# Patient Record
Sex: MALE | Race: WHITE | Employment: FULL TIME | URBAN - METROPOLITAN AREA
[De-identification: names, ages, dates, MRNs, and addresses within clinical notes are randomized per-mention and may not be internally consistent; named-entity substitution may affect disease eponyms.]

---

## 2018-12-10 ENCOUNTER — HOSPITAL ENCOUNTER (OUTPATIENT)
Dept: MAMMOGRAPHY | Age: 58
Discharge: HOME | End: 2018-12-12
Payer: COMMERCIAL

## 2018-12-10 ENCOUNTER — HOSPITAL ENCOUNTER (OUTPATIENT)
Dept: ULTRASOUND IMAGING | Age: 58
Discharge: HOME | End: 2018-12-10
Payer: COMMERCIAL

## 2018-12-10 DIAGNOSIS — N64.4 NIPPLE PAIN: ICD-10-CM

## 2018-12-10 DIAGNOSIS — N64.4 BREAST PAIN: ICD-10-CM

## 2018-12-10 PROCEDURE — 76642 ULTRASOUND BREAST LIMITED: CPT

## 2018-12-10 PROCEDURE — 77066 DX MAMMO INCL CAD BI: CPT

## 2019-12-06 RX ORDER — GLIPIZIDE 5 MG/1
5 TABLET ORAL NIGHTLY
COMMUNITY

## 2019-12-09 ENCOUNTER — ANESTHESIA EVENT (OUTPATIENT)
Dept: ENDOSCOPY | Age: 59
End: 2019-12-09
Payer: COMMERCIAL

## 2019-12-09 ENCOUNTER — HOSPITAL ENCOUNTER (OUTPATIENT)
Age: 59
Setting detail: OUTPATIENT SURGERY
Discharge: HOME | End: 2019-12-09
Attending: INTERNAL MEDICINE | Admitting: INTERNAL MEDICINE
Payer: COMMERCIAL

## 2019-12-09 ENCOUNTER — ANESTHESIA (OUTPATIENT)
Dept: ENDOSCOPY | Age: 59
End: 2019-12-09
Payer: COMMERCIAL

## 2019-12-09 VITALS
OXYGEN SATURATION: 98 % | RESPIRATION RATE: 14 BRPM | SYSTOLIC BLOOD PRESSURE: 83 MMHG | DIASTOLIC BLOOD PRESSURE: 38 MMHG

## 2019-12-09 VITALS
WEIGHT: 169 LBS | TEMPERATURE: 96.5 F | BODY MASS INDEX: 23.66 KG/M2 | HEIGHT: 71 IN | SYSTOLIC BLOOD PRESSURE: 118 MMHG | RESPIRATION RATE: 20 BRPM | OXYGEN SATURATION: 96 % | HEART RATE: 69 BPM | DIASTOLIC BLOOD PRESSURE: 78 MMHG

## 2019-12-09 LAB — METER GLUCOSE: 212 MG/DL (ref 74–99)

## 2019-12-09 PROCEDURE — 3700000001 HC ADD 15 MINUTES (ANESTHESIA): Performed by: INTERNAL MEDICINE

## 2019-12-09 PROCEDURE — 82962 GLUCOSE BLOOD TEST: CPT

## 2019-12-09 PROCEDURE — 2580000003 HC RX 258: Performed by: ANESTHESIOLOGY

## 2019-12-09 PROCEDURE — 7100000011 HC PHASE II RECOVERY - ADDTL 15 MIN: Performed by: INTERNAL MEDICINE

## 2019-12-09 PROCEDURE — 3609027000 HC COLONOSCOPY: Performed by: INTERNAL MEDICINE

## 2019-12-09 PROCEDURE — 7100000010 HC PHASE II RECOVERY - FIRST 15 MIN: Performed by: INTERNAL MEDICINE

## 2019-12-09 PROCEDURE — 6360000002 HC RX W HCPCS: Performed by: NURSE ANESTHETIST, CERTIFIED REGISTERED

## 2019-12-09 PROCEDURE — 3700000000 HC ANESTHESIA ATTENDED CARE: Performed by: INTERNAL MEDICINE

## 2019-12-09 PROCEDURE — 2709999900 HC NON-CHARGEABLE SUPPLY: Performed by: INTERNAL MEDICINE

## 2019-12-09 RX ORDER — PROPOFOL 10 MG/ML
INJECTION, EMULSION INTRAVENOUS CONTINUOUS PRN
Status: DISCONTINUED | OUTPATIENT
Start: 2019-12-09 | End: 2019-12-09 | Stop reason: SDUPTHER

## 2019-12-09 RX ORDER — SODIUM CHLORIDE 9 MG/ML
INJECTION, SOLUTION INTRAVENOUS CONTINUOUS
Status: DISCONTINUED | OUTPATIENT
Start: 2019-12-09 | End: 2019-12-09 | Stop reason: HOSPADM

## 2019-12-09 RX ORDER — FENTANYL CITRATE 50 UG/ML
INJECTION, SOLUTION INTRAMUSCULAR; INTRAVENOUS PRN
Status: DISCONTINUED | OUTPATIENT
Start: 2019-12-09 | End: 2019-12-09 | Stop reason: SDUPTHER

## 2019-12-09 RX ORDER — SODIUM CHLORIDE 0.9 % (FLUSH) 0.9 %
10 SYRINGE (ML) INJECTION EVERY 12 HOURS SCHEDULED
Status: DISCONTINUED | OUTPATIENT
Start: 2019-12-09 | End: 2019-12-09 | Stop reason: HOSPADM

## 2019-12-09 RX ADMIN — SODIUM CHLORIDE: 9 INJECTION, SOLUTION INTRAVENOUS at 08:15

## 2019-12-09 RX ADMIN — FENTANYL CITRATE 50 MCG: 50 INJECTION, SOLUTION INTRAMUSCULAR; INTRAVENOUS at 09:00

## 2019-12-09 RX ADMIN — PROPOFOL 100 MCG/KG/MIN: 10 INJECTION, EMULSION INTRAVENOUS at 09:11

## 2019-12-09 RX ADMIN — SODIUM CHLORIDE: 9 INJECTION, SOLUTION INTRAVENOUS at 09:00

## 2019-12-09 RX ADMIN — FENTANYL CITRATE 50 MCG: 50 INJECTION, SOLUTION INTRAMUSCULAR; INTRAVENOUS at 09:15

## 2019-12-09 ASSESSMENT — PAIN SCALES - GENERAL
PAINLEVEL_OUTOF10: 0
PAINLEVEL_OUTOF10: 0

## 2019-12-09 ASSESSMENT — PAIN - FUNCTIONAL ASSESSMENT: PAIN_FUNCTIONAL_ASSESSMENT: 0-10

## 2021-07-29 ENCOUNTER — HOSPITAL ENCOUNTER (EMERGENCY)
Age: 61
Discharge: HOME | End: 2021-07-29
Attending: EMERGENCY MEDICINE
Payer: COMMERCIAL

## 2021-07-29 VITALS
DIASTOLIC BLOOD PRESSURE: 87 MMHG | OXYGEN SATURATION: 98 % | RESPIRATION RATE: 18 BRPM | HEART RATE: 88 BPM | TEMPERATURE: 97.3 F | SYSTOLIC BLOOD PRESSURE: 127 MMHG

## 2021-07-29 DIAGNOSIS — T15.91XA FOREIGN BODY OF RIGHT EYE, INITIAL ENCOUNTER: Primary | ICD-10-CM

## 2021-07-29 PROCEDURE — 99283 EMERGENCY DEPT VISIT LOW MDM: CPT

## 2021-07-29 RX ORDER — TOBRAMYCIN 3 MG/ML
1 SOLUTION/ DROPS OPHTHALMIC EVERY 6 HOURS
Qty: 1 BOTTLE | Refills: 0 | Status: SHIPPED | OUTPATIENT
Start: 2021-07-29 | End: 2021-08-01

## 2021-07-29 RX ORDER — SODIUM CHLORIDE 9 MG/ML
INJECTION, SOLUTION INTRAVENOUS
Status: DISCONTINUED
Start: 2021-07-29 | End: 2021-07-29 | Stop reason: HOSPADM

## 2021-07-29 ASSESSMENT — PAIN SCALES - GENERAL: PAINLEVEL_OUTOF10: 2

## 2021-07-29 NOTE — ED NOTES
Bed: 15  Expected date:   Expected time:   Means of arrival:   Comments:     Sabino Watters RN  07/29/21 [id number]

## 2021-07-29 NOTE — ED PROVIDER NOTES
History of Present Illness     Patient Identification  Ryne Madrigal is a 56 y.o. male. Patient information was obtained from patient. History/Exam limitations: none. Patient presented to the Emergency Department by private vehicle. Chief Complaint   Foreign Body (pt was working outside around wood and had object go into eye, pt states it feels like it is in his eyelid)      Patient presents for evaluation of right eye foreign body. Patient is working taking on a wall when developed a foreign body sensation in his right eye. Is been persistent for the past hour or so. The pain worsens when he blinks. Improves when he holds his eyes shut. He denies any change in vision. He denies any direct eye trauma. Rates his pain as moderate. Past Medical History:   Diagnosis Date    BPH (benign prostatic hyperplasia)     Diabetes mellitus ([address]     Gout     Hx of blood clots     leg    Hyperlipidemia     Hypertension     MDRO (multiple drug resistant organisms) resistance 2006    MRSA  Rt leg     Family History   Problem Relation Age of Onset    Cancer Mother         lung    Alcohol Abuse Father      Current Facility-Administered Medications   Medication Dose Route Frequency Provider Last Rate Last Admin    fluorescein 0.6 MG ophthalmic strip             sodium chloride 0.9 % infusion              Current Outpatient Medications   Medication Sig Dispense Refill    glipiZIDE (GLUCOTROL) 5 MG tablet Take 5 mg by mouth nightly      SEMAGLUTIDE,0.25 OR 0.5MG/DOS, SC Inject into the skin once a week Sundays      finasteride (PROSCAR) 5 MG tablet Take 5 mg by mouth daily       GLIPIZIDE XL 5 MG extended release tablet Take 10 mg by mouth daily (with breakfast)       Multiple Vitamins-Minerals (THERAPEUTIC MULTIVITAMIN-MINERALS) tablet Take 1 tablet by mouth daily      pantoprazole (PROTONIX) 40 MG tablet Take 1 tablet by mouth daily 30 tablet 3    aspirin 325 MG EC tablet Take 325 mg by mouth daily.       metFORMIN (GLUCOPHAGE) 1000 MG tablet Take 1,000 mg by mouth 2 times daily (with meals). atorvastatin (LIPITOR) 10 MG tablet Take 10 mg by mouth daily. allopurinol (ZYLOPRIM) 300 MG tablet Take 300 mg by mouth daily. lisinopril (PRINIVIL;ZESTRIL) 40 MG tablet Take 40 mg by mouth daily. colchicine 0.6 MG tablet Take 0.6 mg by mouth 2 times daily. Allergies   Allergen Reactions    Penicillins Nausea And Vomiting     Can tolerate IV PCN     Social History     Socioeconomic History    Marital status:      Spouse name: Not on file    Number of children: Not on file    Years of education: Not on file    Highest education level: Not on file   Occupational History    Not on file   Tobacco Use    Smoking status: Never Smoker    Smokeless tobacco: Never Used   Vaping Use    Vaping Use: Never used   Substance and Sexual Activity    Alcohol use: Yes     Alcohol/week: 1.0 standard drinks     Types: 1 Cans of beer per week     Comment: rare    Drug use: No    Sexual activity: Not on file   Other Topics Concern    Not on file   Social History Narrative    Not on file     Social Determinants of Health     Financial Resource Strain:     Difficulty of Paying Living Expenses:    Food Insecurity:     Worried About [facility] in the Last Year:     Ran Out of Food in the Last Year:    Transportation Needs:     Lack of Transportation (Medical):     Lack of Transportation (Non-Medical):    Physical Activity:     Days of Exercise per Week:     Minutes of Exercise per Session:    Stress:     Feeling of Stress :    Social Connections:     Frequency of Communication with Friends and Family:     Frequency of Social Gatherings with Friends and Family:     Attends Confucianist Services:      Active Member of Clubs or Organizations:     Attends Club or Organization Meetings:     Marital Status:    Intimate Partner Violence:     Fear of Current or Ex-Partner:     Emotionally Abused:     Physically Abused:     Sexually Abused:        Review of Systems  . REVIEW OF SYSTEMS:   CONSTITUTIONAL: Denies: fever, chills, diaphoresis, weakness  ALLERGIES: Denies: urticaria, angioedema  EYES: See HPI  ENT: Denies  ore throat, nasal congestion, epistaxis, hearing loss  RESPIRATORY: Denies: cough, hemoptysis, shortness of breath  ENDOCRINE:Denies polydipsia/polyuria, palpitations  HEME-LYMPH: Denies: swollen lymph nodes, bleeding  GI: Denies abdominal pain, flank pain, nausea, vomiting  : Denies: dysuria, hematuria, pelvic pain  MUSCULOSKELETAL: Denies: back pain, joint stiffness, muscle pain  SKIN: Denies: rash, itching, hives    Physical Exam     /87   Pulse 88   Temp 97.3 °F (36.3 °C)   Resp 18   SpO2 98%   Visual Acuity: OS: 20/50      OD:  20/50  General: Awake. Alert in no acute distress. HEENT: atraumatic, normocephalic. PERRLA EOMI, inspection is negative for foreign body. Eversion of upper lower lids is negative for foreign body. Fluorescein staining is negative for foreign body abrasion or ulceration. Conjunctiva: negative for corneal clouding, lacerations, lid erythema. Fluorescein negative for uptake, abrasions, ulcers. ED Course     Patient's foreign body sensation persisted even though no foreign body was identified on examination. The patient is irrigated with a Shon lens a 500 cc normal saline and his foreign body sensation is now resolved completely. He is stable for outpatient management.         --------------------------------------------- PAST HISTORY ---------------------------------------------  Past Medical History:  has a past medical history of BPH (benign prostatic hyperplasia), Diabetes mellitus ([facility] [address] Gout, Hx of blood clots, Hyperlipidemia, Hypertension, and MDRO (multiple drug resistant organisms) resistance. Past Surgical History:  has a past surgical history that includes Appendectomy; knee surgery;  Lithotripsy; hernia repair; Arm Surgery (Right); fracture surgery (Right); detail and they are aware of the specific conditions for emergent return, as well as the importance of follow-up. Discharge Medication List as of 7/29/2021  5:01 PM        START taking these medications    Details   tobramycin (TOBREX) 0.3 % ophthalmic solution Place 1 drop into the right eye every 6 hours for 3 days, Disp-1 Bottle, R-0Print             Diagnosis:  1. Foreign body of right eye, initial encounter        Disposition:  Patient's disposition: Discharge to home  Patient's condition is stable.          Edil Acosta MD  07/29/21 2035

## 2023-06-05 ENCOUNTER — OFFICE VISIT (OUTPATIENT)
Dept: ORTHOPEDIC SURGERY | Age: 63
End: 2023-06-05
Payer: COMMERCIAL

## 2023-06-05 VITALS — WEIGHT: 174 LBS | HEIGHT: 71 IN | TEMPERATURE: 98 F | BODY MASS INDEX: 24.36 KG/M2

## 2023-06-05 DIAGNOSIS — M19.031 ARTHRITIS OF SCAPHOID-TRAPEZIUM-TRAPEZOID JOINT OF RIGHT HAND: ICD-10-CM

## 2023-06-05 DIAGNOSIS — M18.0 ARTHRITIS OF CARPOMETACARPAL (CMC) JOINT OF BOTH THUMBS: Primary | ICD-10-CM

## 2023-06-05 PROCEDURE — 99203 OFFICE O/P NEW LOW 30 MIN: CPT | Performed by: ORTHOPAEDIC SURGERY

## 2023-06-05 NOTE — PROGRESS NOTES
Armanda Ahumada is a 57 y.o. male, who presents   Chief Complaint   Patient presents with    Wrist Pain     Bilateral Wrist x 1 year, no known injury, states of pain at the base of the thumb, no previous wrist surgery. HPI[de-identified] Bilateral hand pains been present for some time. It seems to be more in the nondominant left hand than right. There is an ancient history of a right ulnar shortening done by physician in [place] in the past.  This apparently alleviated wrist pain that Dimitri Curran was having. Now there are lumps at the bases of the thumb metacarpals and discomfort with use over time. He does not have any instability in the joints and still has good  and pinch. Allergies; medications; past medical, surgical, family, and social history; and problem list have been reviewed today and updated as indicated in this encounter - see below following Ortho specifics. Musculoskeletal: Skin condition gross neurovascular functions good in both upper extremities. Shoulder and elbow range of motion stability are good without pain. There is prominence of the base of the thumb metacarpals of both hands. There is tenderness and some limitation of motion and span of the hand. And pinch are strong in both. Slightly more mobility in the left MCA than the right    Radiologic Studies: Imaging of both hands shows degeneration of the right thumb carpometacarpal joint with prominence of the base of the metacarpal slight subluxation. The left hand shows similar changes in the [facility] joint but also triscaphe arthritis as well. There is essentially no joint between the scaphoid trapezium and trapezoid. ASSESSMENT:  Dimitri Curran was seen today for wrist pain. Diagnoses and all orders for this visit:    Arthritis of carpometacarpal (CMC) joint of both thumbs  -     XR HAND LEFT (MIN 3 VIEWS); Future  -     XR HAND RIGHT (MIN 3 VIEWS);  Future    Arthritis of [id number] joint of right hand     Treatment

## 2024-02-07 ENCOUNTER — OFFICE VISIT (OUTPATIENT)
Dept: CARDIOLOGY | Facility: HOSPITAL | Age: 64
End: 2024-02-07
Payer: COMMERCIAL

## 2024-02-07 VITALS
WEIGHT: 162.7 LBS | OXYGEN SATURATION: 98 % | SYSTOLIC BLOOD PRESSURE: 131 MMHG | BODY MASS INDEX: 22.69 KG/M2 | HEART RATE: 76 BPM | DIASTOLIC BLOOD PRESSURE: 78 MMHG

## 2024-02-07 DIAGNOSIS — I48.91 ATRIAL FIBRILLATION, UNSPECIFIED TYPE (MULTI): Primary | ICD-10-CM

## 2024-02-07 DIAGNOSIS — E78.5 HYPERLIPIDEMIA, UNSPECIFIED HYPERLIPIDEMIA TYPE: ICD-10-CM

## 2024-02-07 DIAGNOSIS — I25.10 CORONARY ARTERY DISEASE INVOLVING NATIVE CORONARY ARTERY OF NATIVE HEART WITHOUT ANGINA PECTORIS: ICD-10-CM

## 2024-02-07 PROCEDURE — 99213 OFFICE O/P EST LOW 20 MIN: CPT | Performed by: NURSE PRACTITIONER

## 2024-02-07 PROCEDURE — 99213 OFFICE O/P EST LOW 20 MIN: CPT | Mod: 25 | Performed by: NURSE PRACTITIONER

## 2024-02-07 PROCEDURE — 93010 ELECTROCARDIOGRAM REPORT: CPT | Performed by: INTERNAL MEDICINE

## 2024-02-07 PROCEDURE — 1160F RVW MEDS BY RX/DR IN RCRD: CPT | Performed by: NURSE PRACTITIONER

## 2024-02-07 PROCEDURE — 1159F MED LIST DOCD IN RCRD: CPT | Performed by: NURSE PRACTITIONER

## 2024-02-07 PROCEDURE — 1036F TOBACCO NON-USER: CPT | Performed by: NURSE PRACTITIONER

## 2024-02-07 PROCEDURE — 93005 ELECTROCARDIOGRAM TRACING: CPT | Performed by: NURSE PRACTITIONER

## 2024-02-07 RX ORDER — FINASTERIDE 5 MG/1
5 TABLET, FILM COATED ORAL DAILY
COMMUNITY
Start: 2017-12-12

## 2024-02-07 RX ORDER — COLCHICINE 0.6 MG/1
1 TABLET ORAL 2 TIMES DAILY
COMMUNITY

## 2024-02-07 RX ORDER — ASPIRIN 81 MG/1
1 TABLET ORAL DAILY
COMMUNITY
Start: 2019-12-19

## 2024-02-07 RX ORDER — DAPAGLIFLOZIN 10 MG/1
10 TABLET, FILM COATED ORAL DAILY
COMMUNITY
Start: 2023-11-15

## 2024-02-07 RX ORDER — METOPROLOL SUCCINATE 25 MG/1
1 TABLET, EXTENDED RELEASE ORAL DAILY
COMMUNITY
Start: 2019-12-20

## 2024-02-07 RX ORDER — ALLOPURINOL 300 MG/1
300 TABLET ORAL DAILY
COMMUNITY

## 2024-02-07 RX ORDER — SEMAGLUTIDE 1.34 MG/ML
1 INJECTION, SOLUTION SUBCUTANEOUS
COMMUNITY
Start: 2019-12-23

## 2024-02-07 RX ORDER — GLIPIZIDE 10 MG/1
10 TABLET, FILM COATED, EXTENDED RELEASE ORAL DAILY
COMMUNITY

## 2024-02-07 RX ORDER — PANTOPRAZOLE SODIUM 40 MG/1
40 TABLET, DELAYED RELEASE ORAL
COMMUNITY
Start: 2018-03-02

## 2024-02-07 RX ORDER — ATORVASTATIN CALCIUM 40 MG/1
40 TABLET, FILM COATED ORAL DAILY
COMMUNITY
Start: 2023-11-15

## 2024-02-07 RX ORDER — METFORMIN HYDROCHLORIDE 1000 MG/1
1000 TABLET ORAL
COMMUNITY
Start: 2018-11-19

## 2024-02-07 ASSESSMENT — ENCOUNTER SYMPTOMS
RESPIRATORY NEGATIVE: 1
EYES NEGATIVE: 1
HEMATOLOGIC/LYMPHATIC NEGATIVE: 1
CONSTITUTIONAL NEGATIVE: 1
GASTROINTESTINAL NEGATIVE: 1
NEUROLOGICAL NEGATIVE: 1
ENDOCRINE NEGATIVE: 1
MUSCULOSKELETAL NEGATIVE: 1
PSYCHIATRIC NEGATIVE: 1
CARDIOVASCULAR NEGATIVE: 1

## 2024-02-07 NOTE — PROGRESS NOTES
Referred by Dr. Schafer for Follow-up (1 yr.)     History Of Present Illness:    Layton Angeles is a very pleasant 63 year old gentleman with a history of CAD, HTN and HLD, he is here for a follow up visit. The patient is seen in collaboration with Dr. Lind. Mr. Angeles has been feeling well. Denies chest pain, shortness of breath or heart palpitations. Continues to stay active. End laws recently passed away.     Review of Systems   Constitutional: Negative.   HENT: Negative.     Eyes: Negative.    Cardiovascular: Negative.    Respiratory: Negative.     Endocrine: Negative.    Hematologic/Lymphatic: Negative.    Skin: Negative.    Musculoskeletal: Negative.    Gastrointestinal: Negative.    Neurological: Negative.    Psychiatric/Behavioral: Negative.          Past Medical History:  He has a past medical history of Personal history of other diseases of male genital organs and Personal history of peptic ulcer disease.    Past Surgical History:  He has a past surgical history that includes Other surgical history (12/23/2019); Other surgical history (12/23/2019); Other surgical history (12/23/2019); Other surgical history (12/23/2019); and Other surgical history (01/21/2020).      Social History:  He has no history on file for tobacco use, alcohol use, and drug use.    Family History:  No family history on file.     Allergies:  Penicillins    Outpatient Medications:  Current Outpatient Medications   Medication Instructions    allopurinol (ZYLOPRIM) 300 mg, oral, Daily    aspirin 81 mg EC tablet 1 tablet, oral, Daily    atorvastatin (LIPITOR) 40 mg, oral, Daily    colchicine 0.6 mg tablet 1 tablet, oral, 2 times daily    Farxiga 10 mg, oral, Daily    finasteride (PROSCAR) 5 mg, oral, Daily    glipiZIDE XL (GLUCOTROL XL) 10 mg, oral, Daily    metFORMIN (GLUCOPHAGE) 1,000 mg, oral, 2 times daily with meals    metoprolol succinate XL (Toprol-XL) 25 mg 24 hr tablet 1 tablet, oral, Daily    Ozempic 1 mg, subcutaneous, Weekly     pantoprazole (PROTONIX) 40 mg, oral, Daily before breakfast        Last Recorded Vitals:  Vitals:    02/07/24 0816   BP: 131/78   Pulse: 76   SpO2: 98%   Weight: 73.8 kg (162 lb 11.2 oz)       Physical Exam:  Physical Exam  Vitals reviewed.   HENT:      Head: Normocephalic.      Nose: Nose normal.   Eyes:      Pupils: Pupils are equal, round, and reactive to light.   Cardiovascular:      Rate and Rhythm: Normal rate and regular rhythm.   Pulmonary:      Effort: Pulmonary effort is normal.      Breath sounds: Normal breath sounds.   Abdominal:      General: Abdomen is flat.      Palpations: Abdomen is soft.   Musculoskeletal:         General: Normal range of motion.      Cervical back: Normal range of motion.   Skin:     General: Skin is warm and dry.   Neurological:      General: No focal deficit present.      Mental Status: He is alert and oriented to person, place, and time.   Psychiatric:         Mood and Affect: Mood normal.            Last Labs:  CBC -  Lab Results   Component Value Date    WBC 8.7 12/19/2019    HGB 12.2 (L) 12/19/2019    HCT 36.5 (L) 12/19/2019    MCV 89 12/19/2019     12/19/2019       CMP -  Lab Results   Component Value Date    CALCIUM 8.9 12/19/2019    PROT 7.0 12/18/2019    ALBUMIN 4.2 12/18/2019    AST 27 12/18/2019    ALT 25 12/18/2019    ALKPHOS 58 12/18/2019    BILITOT 0.4 12/18/2019       LIPID PANEL -   Lab Results   Component Value Date    CHOL 251 (H) 11/13/2019    TRIG 127 11/13/2019    HDL 55.3 11/13/2019    CHHDL 4.5 11/13/2019    LDLF 170 (H) 11/13/2019    VLDL 25 11/13/2019       RENAL FUNCTION PANEL -   Lab Results   Component Value Date    GLUCOSE 287 (H) 12/19/2019     12/19/2019    K 4.0 12/19/2019     12/19/2019    CO2 23 12/19/2019    ANIONGAP 15 12/19/2019    BUN 17 12/19/2019    CREATININE 1.23 12/19/2019    CALCIUM 8.9 12/19/2019    ALBUMIN 4.2 12/18/2019        Lab Results   Component Value Date    BNP 12 12/18/2019    HGBA1C 11.8 11/13/2019        Last Cardiology Tests:  ECG  EKG independently reviewed from today showed sinus rhythm heart rate 72 bpm     Echo:    Ejection Fractions:    Cath:  Cardiac cath 12/19/2019   . Left main: no significant angiographic disease.  2. LAD: 85% mid-vessel stenosis, 40% distal stenosis.  3. LCx: no significant angiographic disease.  4. RCA: 30% mid-vessel stenosis.  5. LVEDP 12mmHg, no aortic stenosis on LV-Ao gradient.  6. Successful PCI to severe mid-LAD unstable angina culprit lesion with a 2.5 x  26mm Resolute MIGUEL post-dilated distally with a 2.5mm NC balloon at 20atm and  proximally with a 3.0mm NC balloon at 24atm.  Stress Test:  Nuclear Stress Test 07/10/2023  1. Negative myocardial perfusion study without evidence of inducible  myocardial ischemia or prior infarction.  2. The left ventricle is normal in size.  3. Normal LV wall motion with a post-stress LV EF estimated greater  than 65%.  Cardiac Imaging:      Assessment/Plan   Mr. Angeles is a very pleasant 63 year old gentleman with a history of HTN, HLD and DM, he had a PCI to severe mid-LAD 12/2019, he continues to do well from a cardiac standpoint. Continues to stay active. Denies chest pain or shortness of breath.  Blood pressure and heart rate are well controlled today.      Plan   -call with any questions   -continue Aspirin indefinitely  -follow up in one year   -continue Atorvastatin       Luz Thomson, APRN-CNP

## 2024-02-21 LAB
ATRIAL RATE: 72 BPM
P AXIS: 58 DEGREES
P OFFSET: 203 MS
P ONSET: 149 MS
PR INTERVAL: 152 MS
Q ONSET: 225 MS
QRS COUNT: 12 BEATS
QRS DURATION: 80 MS
QT INTERVAL: 396 MS
QTC CALCULATION(BAZETT): 433 MS
QTC FREDERICIA: 421 MS
R AXIS: 51 DEGREES
T AXIS: 57 DEGREES
T OFFSET: 423 MS
VENTRICULAR RATE: 72 BPM

## 2025-02-05 ENCOUNTER — OFFICE VISIT (OUTPATIENT)
Dept: CARDIOLOGY | Facility: HOSPITAL | Age: 65
End: 2025-02-05
Payer: COMMERCIAL

## 2025-02-05 VITALS
DIASTOLIC BLOOD PRESSURE: 80 MMHG | HEART RATE: 75 BPM | OXYGEN SATURATION: 96 % | BODY MASS INDEX: 23.21 KG/M2 | WEIGHT: 166.45 LBS | SYSTOLIC BLOOD PRESSURE: 129 MMHG

## 2025-02-05 DIAGNOSIS — I25.10 CORONARY ARTERY DISEASE INVOLVING NATIVE CORONARY ARTERY OF NATIVE HEART WITHOUT ANGINA PECTORIS: Primary | ICD-10-CM

## 2025-02-05 DIAGNOSIS — E78.5 HYPERLIPIDEMIA, UNSPECIFIED HYPERLIPIDEMIA TYPE: ICD-10-CM

## 2025-02-05 PROCEDURE — 1036F TOBACCO NON-USER: CPT | Performed by: NURSE PRACTITIONER

## 2025-02-05 PROCEDURE — 99214 OFFICE O/P EST MOD 30 MIN: CPT | Performed by: NURSE PRACTITIONER

## 2025-02-05 PROCEDURE — 1159F MED LIST DOCD IN RCRD: CPT | Performed by: NURSE PRACTITIONER

## 2025-02-05 ASSESSMENT — ENCOUNTER SYMPTOMS
CARDIOVASCULAR NEGATIVE: 1
RESPIRATORY NEGATIVE: 1
EYES NEGATIVE: 1
HEMATOLOGIC/LYMPHATIC NEGATIVE: 1
GASTROINTESTINAL NEGATIVE: 1
CONSTITUTIONAL NEGATIVE: 1
MUSCULOSKELETAL NEGATIVE: 1
PSYCHIATRIC NEGATIVE: 1
ENDOCRINE NEGATIVE: 1
NEUROLOGICAL NEGATIVE: 1

## 2025-02-05 NOTE — PROGRESS NOTES
Referred by Dr. Ana connor. provider found for No chief complaint on file.     History Of Present Illness:    Layton Angeles is a very pleasant 64 year old gentleman with a history of CAD, HTN and HLD, he is here for a follow up visit. The patient is seen in collaboration with Dr. Lind. Mr. Angeles has been feeling well. Denies chest pain, shortness of breath or heart palpitations. Continues to stay active.     Review of Systems   Constitutional: Negative.   HENT: Negative.     Eyes: Negative.    Cardiovascular: Negative.    Respiratory: Negative.     Endocrine: Negative.    Hematologic/Lymphatic: Negative.    Skin: Negative.    Musculoskeletal: Negative.    Gastrointestinal: Negative.    Neurological: Negative.    Psychiatric/Behavioral: Negative.          Past Medical History:  He has a past medical history of Diabetes mellitus ([place]), Hypertension, Personal history of other diseases of male genital organs, and Personal history of peptic ulcer disease.    Past Surgical History:  He has a past surgical history that includes Other surgical history (12/23/2019); Other surgical history (12/23/2019); Other surgical history (12/23/2019); Other surgical history (12/23/2019); Other surgical history (01/21/2020); Cardiac catheterization; and Coronary stent placement.      Social History:  He reports that he has never smoked. He has never used smokeless tobacco. He reports that he does not currently use alcohol. He reports that he does not use drugs.    Family History:  No family history on file.     Allergies:  Penicillins    Outpatient Medications:  Current Outpatient Medications   Medication Instructions    allopurinol (ZYLOPRIM) 300 mg, Daily    aspirin 81 mg EC tablet 1 tablet, Daily    atorvastatin (LIPITOR) 40 mg, Daily    colchicine 0.6 mg tablet 1 tablet, 2 times daily    Farxiga 10 mg, Daily    finasteride (PROSCAR) 5 mg, Daily    glipiZIDE XL (GLUCOTROL XL) 10 mg, Daily    metFORMIN (GLUCOPHAGE) 1,000 mg, 2 times daily  (morning and late afternoon)    metoprolol succinate XL (Toprol-XL) 25 mg 24 hr tablet 1 tablet, Daily    Ozempic 1 mg, Once Weekly    pantoprazole (PROTONIX) 40 mg, Daily before breakfast        Last Recorded Vitals:  Vitals:    02/05/25 0812   BP: 129/80   Pulse: 75   SpO2: 96%   Weight: 75.5 kg (166 lb 7.2 oz)         Physical Exam:  Physical Exam  Vitals reviewed.   HENT:      Head: Normocephalic.      Nose: Nose normal.   Eyes:      Pupils: Pupils are equal, round, and reactive to light.   Cardiovascular:      Rate and Rhythm: Normal rate and regular rhythm.   Pulmonary:      Effort: Pulmonary effort is normal.      Breath sounds: Normal breath sounds.   Abdominal:      General: Abdomen is flat.      Palpations: Abdomen is soft.   Musculoskeletal:         General: Normal range of motion.      Cervical back: Normal range of motion.   Skin:     General: Skin is warm and dry.   Neurological:      General: No focal deficit present.      Mental Status: He is alert and oriented to person, place, and time.   Psychiatric:         Mood and Affect: Mood normal.            Last Labs:  CBC -  Lab Results   Component Value Date    WBC 8.7 12/19/2019    HGB 12.2 (L) 12/19/2019    HCT 36.5 (L) 12/19/2019    MCV 89 12/19/2019     12/19/2019       CMP -  Lab Results   Component Value Date    CALCIUM 8.9 12/19/2019    PROT 7.0 12/18/2019    ALBUMIN 4.2 12/18/2019    AST 27 12/18/2019    ALT 25 12/18/2019    ALKPHOS 58 12/18/2019    BILITOT 0.4 12/18/2019       LIPID PANEL -   Lab Results   Component Value Date    CHOL 251 (H) 11/13/2019    TRIG 127 11/13/2019    HDL 55.3 11/13/2019    CHHDL 4.5 11/13/2019    LDLF 170 (H) 11/13/2019    VLDL 25 11/13/2019       RENAL FUNCTION PANEL -   Lab Results   Component Value Date    GLUCOSE 287 (H) 12/19/2019     12/19/2019    K 4.0 12/19/2019     12/19/2019    CO2 23 12/19/2019    ANIONGAP 15 12/19/2019    BUN 17 12/19/2019    CREATININE 1.23 12/19/2019    CALCIUM 8.9  12/19/2019    ALBUMIN 4.2 12/18/2019        Lab Results   Component Value Date    BNP 12 12/18/2019    HGBA1C 11.8 11/13/2019       Last Cardiology Tests:  ECG    Echo:    Ejection Fractions:    Cath:  Cardiac cath 12/19/2019   . Left main: no significant angiographic disease.  2. LAD: 85% mid-vessel stenosis, 40% distal stenosis.  3. LCx: no significant angiographic disease.  4. RCA: 30% mid-vessel stenosis.  5. LVEDP 12mmHg, no aortic stenosis on LV-Ao gradient.  6. Successful PCI to severe mid-LAD unstable angina culprit lesion with a 2.5 x  26mm Resolute MIGUEL post-dilated distally with a 2.5mm NC balloon at 20atm and  proximally with a 3.0mm NC balloon at 24atm.  Stress Test:  Nuclear Stress Test 07/10/2023  1. Negative myocardial perfusion study without evidence of inducible  myocardial ischemia or prior infarction.  2. The left ventricle is normal in size.  3. Normal LV wall motion with a post-stress LV EF estimated greater  than 65%.  Cardiac Imaging:      Assessment/Plan   Mr. Angeles is a very pleasant 64 year old gentleman with a history of HTN, HLD and DM, he had a PCI to severe mid-LAD 12/2019, he continues to do well from a cardiac standpoint. Continues to stay active. Denies chest pain or shortness of breath.  Blood pressure and heart rate are well controlled today.      Plan   -call with any questions   -continue Aspirin 81 mg daily indefinitely  -follow up in one year   -continue Atorvastatin 40 mg daily       Luz Thomson, APRN-CNP

## (undated) DEVICE — Device: Brand: DEFENDO VALVE AND CONNECTOR KIT

## (undated) DEVICE — GOWN ISOLATN REG YEL M WT MULTIPLY SIDETIE LEV 2

## (undated) DEVICE — CONTAINER SPEC COLL 960ML POLYPR TRIANG GRAD INTAKE/OUTPUT

## (undated) DEVICE — KIT BEDSIDE REVITAL OX 500ML

## (undated) DEVICE — LUBRICANT SURG JELLY ST BACTER TUBE 4.25OZ

## (undated) DEVICE — FORCEPS BX L240CM JAW DIA2.8MM L CAP W/ NDL MIC MESH TOOTH

## (undated) DEVICE — 6 X 9  1.75MIL 4-WALL LABGUARD: Brand: MINIGRIP COMMERCIAL LLC

## (undated) DEVICE — TUBING, SUCTION, 1/4" X 10', STRAIGHT: Brand: MEDLINE

## (undated) DEVICE — MASK,FACE,MAXFLUIDPROTECT,SHIELD/ERLPS: Brand: MEDLINE

## (undated) DEVICE — KENDALL 450 SERIES MONITORING FOAM ELECTRODE - RECTANGULAR SHAPE ( 3/PK): Brand: KENDALL

## (undated) DEVICE — SPONGE GZ 4IN 4IN 4 PLY N WVN AVANT